# Patient Record
Sex: FEMALE | Race: WHITE | NOT HISPANIC OR LATINO | Employment: UNEMPLOYED | URBAN - METROPOLITAN AREA
[De-identification: names, ages, dates, MRNs, and addresses within clinical notes are randomized per-mention and may not be internally consistent; named-entity substitution may affect disease eponyms.]

---

## 2019-08-03 ENCOUNTER — HOSPITAL ENCOUNTER (OUTPATIENT)
Dept: URGENT CARE | Facility: CLINIC | Age: 39
Discharge: HOME | End: 2019-08-03

## 2019-09-23 ENCOUNTER — HOSPITAL ENCOUNTER (OUTPATIENT)
Dept: URGENT CARE | Facility: CLINIC | Age: 39
Discharge: HOME | End: 2019-09-23
Attending: PHYSICIAN ASSISTANT

## 2022-10-13 ENCOUNTER — APPOINTMENT (OUTPATIENT)
Dept: GENERAL RADIOLOGY | Facility: HOSPITAL | Age: 42
End: 2022-10-13

## 2022-10-13 ENCOUNTER — HOSPITAL ENCOUNTER (EMERGENCY)
Facility: HOSPITAL | Age: 42
Discharge: HOME | End: 2022-10-13
Attending: EMERGENCY MEDICINE | Admitting: EMERGENCY MEDICINE

## 2022-10-13 VITALS
HEIGHT: 62 IN | TEMPERATURE: 97.5 F | WEIGHT: 200 LBS | BODY MASS INDEX: 36.8 KG/M2 | OXYGEN SATURATION: 98 % | SYSTOLIC BLOOD PRESSURE: 119 MMHG | RESPIRATION RATE: 15 BRPM | DIASTOLIC BLOOD PRESSURE: 76 MMHG | HEART RATE: 74 BPM

## 2022-10-13 DIAGNOSIS — M79.672 LEFT FOOT PAIN: Primary | ICD-10-CM

## 2022-10-13 DIAGNOSIS — S90.32XA CONTUSION OF LEFT FOOT, INITIAL ENCOUNTER: ICD-10-CM

## 2022-10-13 PROCEDURE — 99283 EMERGENCY DEPT VISIT LOW MDM: CPT

## 2022-10-13 PROCEDURE — 73630 X-RAY EXAM OF FOOT: CPT

## 2022-10-13 NOTE — ED PROVIDER NOTES
Subjective   History of Present Illness  Patient is a 42-year-old obese female who comes in complaining of pain to the left heel.  She states that she was moving an umbrella on her porch today when it came back and hit her in the heel and she had immediate large amount of swelling and she states that she had pain and she began to feel lightheaded she states that she felt better when she elevated her leg.  She states the pain is minimal now she states it is a 4/10.  She states that it is constant but worse when she tries to ambulate.  She states the swelling is gone down since she is elevated her leg        Review of Systems   Constitutional: Negative for chills, fatigue and fever.   HENT: Negative for congestion, tinnitus and trouble swallowing.    Eyes: Negative for photophobia, discharge and redness.   Respiratory: Negative for cough and shortness of breath.    Cardiovascular: Negative for chest pain and palpitations.   Gastrointestinal: Negative for abdominal pain, diarrhea, nausea and vomiting.   Genitourinary: Negative for dysuria, frequency and urgency.   Musculoskeletal: Negative for back pain, joint swelling and myalgias.        Left foot pain   Skin: Negative for rash.   Neurological: Negative for dizziness and headaches.   Psychiatric/Behavioral: Negative for confusion.   All other systems reviewed and are negative.      Past Medical History:   Diagnosis Date   • Asthma        Allergies   Allergen Reactions   • Augmentin [Amoxicillin-Pot Clavulanate] Rash       History reviewed. No pertinent surgical history.    History reviewed. No pertinent family history.    Social History     Socioeconomic History   • Marital status:    Tobacco Use   • Smoking status: Never   • Smokeless tobacco: Never   Substance and Sexual Activity   • Alcohol use: Not Currently           Objective   Physical Exam  Vitals reviewed.   Constitutional:       General: She is not in acute distress.     Appearance: She is  well-developed. She is obese. She is not ill-appearing or toxic-appearing.   HENT:      Head: Normocephalic and atraumatic.   Eyes:      Conjunctiva/sclera: Conjunctivae normal.      Pupils: Pupils are equal, round, and reactive to light.   Cardiovascular:      Rate and Rhythm: Normal rate and regular rhythm.      Pulses: Normal pulses.           Dorsalis pedis pulses are 2+ on the right side and 2+ on the left side.        Posterior tibial pulses are 2+ on the right side and 2+ on the left side.      Heart sounds: Normal heart sounds.   Pulmonary:      Effort: Pulmonary effort is normal. No respiratory distress.      Breath sounds: Normal breath sounds. No wheezing.   Musculoskeletal:         General: No deformity. Normal range of motion.      Cervical back: Normal range of motion and neck supple.      Left foot: No deformity.        Feet:    Feet:      Right foot:      Skin integrity: Skin integrity normal.      Left foot:      Skin integrity: Skin integrity normal.   Skin:     General: Skin is warm and dry.      Capillary Refill: Capillary refill takes less than 2 seconds.   Neurological:      General: No focal deficit present.      Mental Status: She is alert and oriented to person, place, and time.      GCS: GCS eye subscore is 4. GCS verbal subscore is 5. GCS motor subscore is 6.      Cranial Nerves: No cranial nerve deficit.      Sensory: No sensory deficit.      Deep Tendon Reflexes: Reflexes normal.   Psychiatric:         Attention and Perception: Attention normal.         Mood and Affect: Mood normal.         Speech: Speech normal.         Behavior: Behavior normal. Behavior is cooperative.         Procedures           ED Course  ED Course as of 10/13/22 1542   u Oct 13, 2022   1542 Patient was walked by the ER technician and did well-was told of the negative x-ray and the need to follow-up with Dr. Grier if not improving and to return if worse she verbalized understood discharge instruction [KW]     "  ED Course User Index  [KW] Susy Wolf, APRN      /76 (BP Location: Left arm, Patient Position: Sitting)   Pulse 74   Temp 97.5 °F (36.4 °C) (Oral)   Resp 15   Ht 157.5 cm (62\")   Wt 90.7 kg (200 lb)   LMP 09/22/2022 (Approximate)   SpO2 98%   Breastfeeding No   BMI 36.58 kg/m²   Labs Reviewed - No data to display  Medications - No data to display  XR Foot 3+ View Left    Result Date: 10/13/2022  Negative for fracture.  Electronically Signed By-Dick Marquez MD On:10/13/2022 3:26 PM This report was finalized on [id number] by  Dick Marquez MD.                                         MDM  Number of Diagnoses or Management Options  Contusion of left foot, initial encounter  Left foot pain  Diagnosis management comments: Patient had above exam and x-ray was obtained and reviewed and discussed with the patient and found to be within normal limits the patient was told [id number] with Dr. Grier if not improving patient verbalized understood discharge instructions use Tylenol Motrin for discomfort she was ambulatory at discharge without any distress she was discharged home with her  at       Amount and/or Complexity of Data Reviewed  Tests in the radiology section of CPT®: reviewed    Risk of Complications, Morbidity, and/or Mortality  Presenting problems: high  Diagnostic procedures: high  Management options: high    Patient Progress  Patient progress: improved      Final diagnoses:   Left foot pain   Contusion of left foot, initial encounter       ED Disposition  ED Disposition     ED Disposition   Discharge    Condition   Stable    Comment   --             GAEL Grier DPM  [address] [place] [address]  [phone]    In 3 days  If symptoms worsen, As needed         Medication List      No changes were made to your prescriptions during this visit.          Susy Wolf, APRN  10/13/22 1542    "

## 2022-10-13 NOTE — DISCHARGE INSTRUCTIONS
Use Tylenol and Motrin as needed for discomfort    Follow-up with Dr. Grier the on-call podiatrist for pain greater than 5 days.    Return if worse

## 2023-02-01 ENCOUNTER — OFFICE VISIT (OUTPATIENT)
Dept: FAMILY MEDICINE CLINIC | Facility: CLINIC | Age: 43
End: 2023-02-01
Payer: OTHER GOVERNMENT

## 2023-02-01 VITALS
HEART RATE: 107 BPM | OXYGEN SATURATION: 99 % | WEIGHT: 212.6 LBS | BODY MASS INDEX: 37.67 KG/M2 | HEIGHT: 63 IN | DIASTOLIC BLOOD PRESSURE: 78 MMHG | SYSTOLIC BLOOD PRESSURE: 112 MMHG | RESPIRATION RATE: 18 BRPM

## 2023-02-01 DIAGNOSIS — Z12.4 CERVICAL CANCER SCREENING: ICD-10-CM

## 2023-02-01 DIAGNOSIS — Z12.31 ENCOUNTER FOR SCREENING MAMMOGRAM FOR MALIGNANT NEOPLASM OF BREAST: ICD-10-CM

## 2023-02-01 DIAGNOSIS — J45.20 MILD INTERMITTENT ASTHMA WITHOUT COMPLICATION: ICD-10-CM

## 2023-02-01 DIAGNOSIS — Z00.00 ANNUAL PHYSICAL EXAM: Primary | ICD-10-CM

## 2023-02-01 PROCEDURE — 99396 PREV VISIT EST AGE 40-64: CPT | Performed by: STUDENT IN AN ORGANIZED HEALTH CARE EDUCATION/TRAINING PROGRAM

## 2023-02-01 RX ORDER — ALBUTEROL SULFATE 90 UG/1
2 AEROSOL, METERED RESPIRATORY (INHALATION) EVERY 4 HOURS PRN
Qty: 18 G | Refills: 1 | Status: SHIPPED | OUTPATIENT
Start: 2023-02-01

## 2023-02-01 NOTE — PROGRESS NOTES
"Chief Complaint  Chief Complaint   Patient presents with   • Establish Care   • Asthma     Subjective        Geetha Zambrano is a 42 y.o. female who presents to [facility].    History of Present Illness  Here to establish care.    She has not had a PCP in a while.   She has history of asthma but reports it is not active at this time.  It is generally activity or allergy induced.      Has lost 22 pounds since last August.  Eating keto.  Starting to get back into the gym, typically walks.  Sleep: no concerns, 8 hrs typically.      Family history: paternal GM w/ colon ca in her 50s.  Maternal / paternal GM both w/ breast cancer.       Has not had a mammogram yet.  Has not had a pap smear since her last pregnancy 3 yrs ago.      No history of gestational DM.      Objective   /78   Pulse 107   Resp 18   Ht 160 cm (63\")   Wt 96.4 kg (212 lb 9.6 oz)   SpO2 99%   BMI 37.66 kg/m²     Estimated body mass index is 37.66 kg/m² as calculated from the following:    Height as of this encounter: 160 cm (63\").    Weight as of this encounter: 96.4 kg (212 lb 9.6 oz).     Physical Exam   GEN: In no acute distress, non toxic appearing  HEENT: Pupils equal and reactive to light, sclera clear. Mucous membranes moist. Oropharynx without erythema or exudate. No cervical or submandibular lymphadenopathy.  TMs WNL bilaterally.    CV: Regular rate and rhythm, no murmurs, 2+ peripheral pulses, No extremity edema.   RESP: Lungs clear to auscultation anteriorly and posteriorly in all lung fields bilaterally.  NEURO: AAO to person, place, and time. CN 2-12 intact grossly  PSYCH: Affect normal, insight fair     PHQ-2 Depression Screening  Little interest or pleasure in doing things? 0-->not at all   Feeling down, depressed, or hopeless? 0-->not at all   PHQ-2 Total Score 0      Result Review :              Assessment and Plan     Diagnoses and all orders for this visit:    1. Annual physical exam " (Primary)  Overall reassuring exam.  Discussed healthy diet and exercise recs.  Exciting she has lost 22 pounds since last August, encourage to keep up good work.  Refer to gyn for pap.  Start mammograms for breast ca screening.  Will be due for colon ca screening next year.  F/u in 1 yr or sooner prn.    -     Comprehensive metabolic panel; Future  -     CBC (No Diff); Future  -     Lipid Panel; Future    2. Encounter for screening mammogram for malignant neoplasm of breast  -     Mammo Screening Digital Tomosynthesis Bilateral With CAD; Future    3. Mild intermittent asthma without complication  Refill albuterol inhaler  -     albuterol sulfate  (90 Base) MCG/ACT inhaler; Inhale 2 puffs Every 4 (Four) Hours As Needed for Wheezing.  Dispense: 18 g; Refill: 1    4. Cervical cancer screening  -     Ambulatory Referral to Gynecology       Follow Up     Return in about 1 year (around 2/1/2024) for Annual physical.

## 2023-02-03 ENCOUNTER — LAB (OUTPATIENT)
Dept: FAMILY MEDICINE CLINIC | Facility: CLINIC | Age: 43
End: 2023-02-03
Payer: OTHER GOVERNMENT

## 2023-02-03 DIAGNOSIS — Z00.00 ANNUAL PHYSICAL EXAM: ICD-10-CM

## 2023-02-03 LAB
ALBUMIN SERPL-MCNC: 4.4 G/DL (ref 3.5–5.2)
ALBUMIN/GLOB SERPL: 2.2 G/DL
ALP SERPL-CCNC: 52 U/L (ref 39–117)
ALT SERPL W P-5'-P-CCNC: 20 U/L (ref 1–33)
ANION GAP SERPL CALCULATED.3IONS-SCNC: 8 MMOL/L (ref 5–15)
AST SERPL-CCNC: 19 U/L (ref 1–32)
BILIRUB SERPL-MCNC: 0.3 MG/DL (ref 0–1.2)
BUN SERPL-MCNC: 12 MG/DL (ref 6–20)
BUN/CREAT SERPL: 14.3 (ref 7–25)
CALCIUM SPEC-SCNC: 9.4 MG/DL (ref 8.6–10.5)
CHLORIDE SERPL-SCNC: 104 MMOL/L (ref 98–107)
CHOLEST SERPL-MCNC: 188 MG/DL (ref 0–200)
CO2 SERPL-SCNC: 27 MMOL/L (ref 22–29)
CREAT SERPL-MCNC: 0.84 MG/DL (ref 0.57–1)
DEPRECATED RDW RBC AUTO: 40.2 FL (ref 37–54)
EGFRCR SERPLBLD CKD-EPI 2021: 88 ML/MIN/1.73
ERYTHROCYTE [DISTWIDTH] IN BLOOD BY AUTOMATED COUNT: 12.7 % (ref 12.3–15.4)
GLOBULIN UR ELPH-MCNC: 2 GM/DL
GLUCOSE SERPL-MCNC: 105 MG/DL (ref 65–99)
HCT VFR BLD AUTO: 41.1 % (ref 34–46.6)
HDLC SERPL-MCNC: 55 MG/DL (ref 40–60)
HGB BLD-MCNC: 14 G/DL (ref 12–15.9)
LDLC SERPL CALC-MCNC: 117 MG/DL (ref 0–100)
LDLC/HDLC SERPL: 2.11 {RATIO}
MCH RBC QN AUTO: 30 PG (ref 26.6–33)
MCHC RBC AUTO-ENTMCNC: 34.1 G/DL (ref 31.5–35.7)
MCV RBC AUTO: 88 FL (ref 79–97)
PLATELET # BLD AUTO: 268 10*3/MM3 (ref 140–450)
PMV BLD AUTO: 10.3 FL (ref 6–12)
POTASSIUM SERPL-SCNC: 4 MMOL/L (ref 3.5–5.2)
PROT SERPL-MCNC: 6.4 G/DL (ref 6–8.5)
RBC # BLD AUTO: 4.67 10*6/MM3 (ref 3.77–5.28)
SODIUM SERPL-SCNC: 139 MMOL/L (ref 136–145)
TRIGL SERPL-MCNC: 85 MG/DL (ref 0–150)
VLDLC SERPL-MCNC: 16 MG/DL (ref 5–40)
WBC NRBC COR # BLD: 8.1 10*3/MM3 (ref 3.4–10.8)

## 2023-02-03 PROCEDURE — 36415 COLL VENOUS BLD VENIPUNCTURE: CPT

## 2023-02-03 PROCEDURE — 80053 COMPREHEN METABOLIC PANEL: CPT | Performed by: STUDENT IN AN ORGANIZED HEALTH CARE EDUCATION/TRAINING PROGRAM

## 2023-02-03 PROCEDURE — 80061 LIPID PANEL: CPT | Performed by: STUDENT IN AN ORGANIZED HEALTH CARE EDUCATION/TRAINING PROGRAM

## 2023-02-03 PROCEDURE — 85027 COMPLETE CBC AUTOMATED: CPT | Performed by: STUDENT IN AN ORGANIZED HEALTH CARE EDUCATION/TRAINING PROGRAM

## 2023-02-07 NOTE — PROGRESS NOTES
I spoke with Geetha and relayed Dr. Chau message that her labs overall looked fine.  Her kidney function, liver function, and blood counts were all normal.  Her LDL cholesterol was slightly elevated at 117, but her current diet changes as well as getting back into the gym like we talked about at her appointment should bring her LDL down.  Otherwise, everything looked good and no changes need to be made at this time.  She said ok and I resent her a [facility].

## 2023-02-24 ENCOUNTER — HOSPITAL ENCOUNTER (OUTPATIENT)
Dept: MAMMOGRAPHY | Facility: HOSPITAL | Age: 43
Discharge: HOME | End: 2023-02-24
Admitting: STUDENT IN AN ORGANIZED HEALTH CARE EDUCATION/TRAINING PROGRAM
Payer: OTHER GOVERNMENT

## 2023-02-24 DIAGNOSIS — Z12.31 ENCOUNTER FOR SCREENING MAMMOGRAM FOR MALIGNANT NEOPLASM OF BREAST: ICD-10-CM

## 2023-02-24 PROCEDURE — 77063 BREAST TOMOSYNTHESIS BI: CPT

## 2023-02-24 PROCEDURE — 77067 SCR MAMMO BI INCL CAD: CPT

## 2023-02-27 ENCOUNTER — TELEPHONE (OUTPATIENT)
Dept: FAMILY MEDICINE CLINIC | Facility: CLINIC | Age: 43
End: 2023-02-27
Payer: OTHER GOVERNMENT

## 2023-02-27 NOTE — TELEPHONE ENCOUNTER
----- Message from Doyle Elias DO sent at 2/24/2023  2:47 PM EST -----  Please let Geetha know her mammogram was negative for any signs of cancer which is good news.  We will discuss repeating next year at her next physical.

## 2023-02-27 NOTE — TELEPHONE ENCOUNTER
I spoke with Geetha and relayed Dr. Elias's message that her mammogram was negative for any signs of cancer and we will discuss repeating at her next annual physical.

## 2023-09-20 ENCOUNTER — OFFICE VISIT (OUTPATIENT)
Dept: FAMILY MEDICINE CLINIC | Facility: CLINIC | Age: 43
End: 2023-09-20
Payer: OTHER GOVERNMENT

## 2023-09-20 VITALS
DIASTOLIC BLOOD PRESSURE: 81 MMHG | RESPIRATION RATE: 18 BRPM | WEIGHT: 201.2 LBS | SYSTOLIC BLOOD PRESSURE: 122 MMHG | OXYGEN SATURATION: 98 % | HEIGHT: 63 IN | HEART RATE: 83 BPM | BODY MASS INDEX: 35.65 KG/M2

## 2023-09-20 DIAGNOSIS — E66.9 OBESITY, CLASS II, BMI 35-39.9: Primary | ICD-10-CM

## 2023-09-20 PROCEDURE — 99213 OFFICE O/P EST LOW 20 MIN: CPT | Performed by: STUDENT IN AN ORGANIZED HEALTH CARE EDUCATION/TRAINING PROGRAM

## 2023-09-20 RX ORDER — PHENTERMINE HYDROCHLORIDE 37.5 MG/1
37.5 TABLET ORAL
Qty: 90 TABLET | Refills: 0 | Status: SHIPPED | OUTPATIENT
Start: 2023-09-20

## 2023-09-20 NOTE — PROGRESS NOTES
"Chief Complaint  Chief Complaint   Patient presents with    Weight Loss     Subjective        Geetha Lawton is a 42 y.o. female who presents to [facility].    History of Present Illness  Here to discuss weight loss.  She has lost about 30 pounds since August of last year through keto diet.  She has had success but does feel like the weight loss has slowed down recently.    She is interested in weight loss medication to help boost her weight loss again.      Objective   /81   Pulse 83   Resp 18   Ht 160 cm (63\")   Wt 91.3 kg (201 lb 3.2 oz)   SpO2 98%   BMI 35.64 kg/m²     Estimated body mass index is 35.64 kg/m² as calculated from the following:    Height as of this encounter: 160 cm (63\").    Weight as of this encounter: 91.3 kg (201 lb 3.2 oz).     Physical Exam   GEN: In no acute distress, non toxic appearing    Result Review :              Assessment and Plan     Diagnoses and all orders for this visit:    1. Obesity, Class II, BMI 35-39.9 (Primary)  Discussed weight loss options.  Wegovy on nationwide shortage right now so not an option.  Will do 3 months of phentermine daily.  Discussed side effects to monitor for - she has been on in the past w/o negative side effects.  Continue low carb diet.  F/u 3 months.    -     phentermine (ADIPEX-P) 37.5 MG tablet; Take 1 tablet by mouth Every Morning Before Breakfast.  Dispense: 90 tablet; Refill: 0      Follow Up     Return in about 3 months (around 12/20/2023) for weight check .  "

## 2023-10-24 ENCOUNTER — TELEPHONE (OUTPATIENT)
Dept: FAMILY MEDICINE CLINIC | Facility: CLINIC | Age: 43
End: 2023-10-24
Payer: OTHER GOVERNMENT

## 2023-10-24 DIAGNOSIS — E66.9 OBESITY, CLASS II, BMI 35-39.9: Primary | ICD-10-CM

## 2023-10-24 NOTE — TELEPHONE ENCOUNTER
Patient has been trying to get her Phentermine filled since 9/20/2023 with no luck.  The 37.5mg tablets are out of stock with no return date known.  Needs a new rx sent in for Phentermine 37.5mg CAPSULES to [facility].  She will need a PA.  Their phone number for PA is [phone] and fax is [phone].  I told the patient we don't call for PA's but I took the numbers any way.

## 2023-10-25 RX ORDER — PHENTERMINE HYDROCHLORIDE 37.5 MG/1
37.5 CAPSULE ORAL EVERY MORNING
Qty: 90 CAPSULE | Refills: 0 | Status: SHIPPED | OUTPATIENT
Start: 2023-10-25

## 2023-10-25 NOTE — TELEPHONE ENCOUNTER
Gave message to patient at 9:36am.  She said that going thru [facility] is free for her.  There is no co-pay for her thru [facility].    She still wants rx for Phentermine 37.5mg CAPSULES #90 sent to [facility].

## 2023-11-06 ENCOUNTER — TELEPHONE (OUTPATIENT)
Dept: FAMILY MEDICINE CLINIC | Facility: CLINIC | Age: 43
End: 2023-11-06

## 2023-11-06 ENCOUNTER — OFFICE VISIT (OUTPATIENT)
Dept: FAMILY MEDICINE CLINIC | Facility: CLINIC | Age: 43
End: 2023-11-06
Payer: OTHER GOVERNMENT

## 2023-11-06 VITALS
BODY MASS INDEX: 36.71 KG/M2 | RESPIRATION RATE: 18 BRPM | SYSTOLIC BLOOD PRESSURE: 123 MMHG | DIASTOLIC BLOOD PRESSURE: 79 MMHG | HEART RATE: 91 BPM | HEIGHT: 63 IN | WEIGHT: 207.2 LBS | OXYGEN SATURATION: 96 %

## 2023-11-06 DIAGNOSIS — H93.8X3 PRESSURE SENSATION IN BOTH EARS: ICD-10-CM

## 2023-11-06 DIAGNOSIS — J01.10 ACUTE NON-RECURRENT FRONTAL SINUSITIS: Primary | ICD-10-CM

## 2023-11-06 DIAGNOSIS — J02.9 ACUTE SORE THROAT: ICD-10-CM

## 2023-11-06 PROCEDURE — 99213 OFFICE O/P EST LOW 20 MIN: CPT | Performed by: STUDENT IN AN ORGANIZED HEALTH CARE EDUCATION/TRAINING PROGRAM

## 2023-11-06 RX ORDER — DOXYCYCLINE HYCLATE 100 MG/1
100 CAPSULE ORAL 2 TIMES DAILY
Qty: 14 CAPSULE | Refills: 0 | Status: SHIPPED | OUTPATIENT
Start: 2023-11-06 | End: 2023-11-13

## 2023-11-06 NOTE — PROGRESS NOTES
"Chief Complaint  Chief Complaint   Patient presents with    Sore Throat    Earache    Nasal Congestion    Cough    Fever     Subjective        Geetha Lawton is a 43 y.o. female who presents to [facility].    History of Present Illness  Here for acute visit.    Symptoms started last Thursday.  Sick contacts at home.  She has facial pain, pressure, ear pressure, cough, sore throat, hoarse voice.    Alternating ibuprofen / tylenol.   Drinking hot tea, soups which helps with her sore throat.    Also starting to develop redness of both eyes.      Objective   /79   Pulse 91   Resp 18   Ht 160 cm (63\")   Wt 94 kg (207 lb 3.2 oz)   SpO2 96%   BMI 36.70 kg/m²     Estimated body mass index is 36.7 kg/m² as calculated from the following:    Height as of this encounter: 160 cm (63\").    Weight as of this encounter: 94 kg (207 lb 3.2 oz).     Physical Exam   GEN: In no acute distress, fatigued appearing, hoarse sounding   HEENT: Pupils equal and reactive to light, sclera clear. Mucous membranes moist. Oropharynx without erythema or exudate. + tender cervical and submandibular lymphadenopathy.  Bilateral TM's w/ fluid behind.    CV: Regular rate and rhythm, no murmurs  RESP: Lungs clear to auscultation anteriorly and posteriorly in all lung fields bilaterally.     Result Review :              Assessment and Plan     Diagnoses and all orders for this visit:    1. Acute non-recurrent frontal sinusitis (Primary)  Treat with doxycycline 100 mg bid x 7 days.  Continue daily nasacort.  Continue alternating tylenol / ibuprofen, hot teas, soups.  If eyes worsen in purulent drainage let me know and I will send in antibacterial drops.  Drink plenty of fluids.    -     doxycycline (VIBRAMYCIN) 100 MG capsule; Take 1 capsule by mouth 2 (Two) Times a Day for 7 days.  Dispense: 14 capsule; Refill: 0    2. Pressure sensation in both ears  See above    3. Acute sore throat  See above     "     Follow Up   If no improvement after 72 hrs

## 2023-11-06 NOTE — TELEPHONE ENCOUNTER
Dr. Elias    Ins companies prefer the phentermine tablet  over the capsule   would you be ok to change to tablets

## 2023-11-06 NOTE — TELEPHONE ENCOUNTER
----- Message from Elicia Gauthier MA sent at 11/2/2023  3:56 PM EDT -----  Regarding: FW: Prio Approval   Contact: [phone]  Do you  know if a PA has been done?    ----- Message -----  From: Geetha Lawton  Sent: 11/2/2023  10:32 AM EDT  To: [facility] Clinical Pool  Subject: Prio Approval                                    I received the following message from [facility] Oct 26th regarding the Rx from Dr. Elias for Phentermine:  Prior Authorization Required     Before we can ship your medicine, we need your benefits plan to approve it through a process called prior authorization to make sure your plan covers this medicine. We reached out to them, and want to let you know that your medicine delivery could be delayed until they respond. If we don't receive the authorization and are unable to fill this prescription, we'll let you know.     Please let me know if the prior authorization has been requested ( is my insurance carrier).  Thank you,  Geetha

## 2023-11-06 NOTE — TELEPHONE ENCOUNTER
RELAY    Left detailed message on patient's voice mail at 4:25pm.  PLEASE SEE IF WE CAN CALL LOCAL 90 DAYS IN TABLET FORM IT SHOULD GO THRU HER INS.  And what local pharmacy?

## 2023-11-07 NOTE — TELEPHONE ENCOUNTER
RELAY     Left detailed message on patient's voice mail at 2:04pm.  PLEASE SEE IF WE CAN CALL LOCAL 90 DAYS IN TABLET FORM IT SHOULD GO THRU HER INS.  And what local pharmacy?

## 2023-11-08 NOTE — TELEPHONE ENCOUNTER
Spoke with patient and she said no matter if she gets the rx from mail order or local, it WILL require a PA no matter what.  And she has been told it will get approved because she does qualify for the med.  She doesn't care if it is tablets or capsules, but she prefers to use [facility] because there will be zero dollar co-pay thru mail order.  If local, she would have a co-pay.  [facility] already has an rx.  Patient is asking if you will please do the PA for that rx and it will be approved.

## 2024-02-13 ENCOUNTER — OFFICE VISIT (OUTPATIENT)
Dept: FAMILY MEDICINE CLINIC | Facility: CLINIC | Age: 44
End: 2024-02-13
Payer: OTHER GOVERNMENT

## 2024-02-13 VITALS
HEART RATE: 108 BPM | RESPIRATION RATE: 18 BRPM | OXYGEN SATURATION: 99 % | DIASTOLIC BLOOD PRESSURE: 78 MMHG | BODY MASS INDEX: 35.5 KG/M2 | SYSTOLIC BLOOD PRESSURE: 118 MMHG | HEIGHT: 61 IN | WEIGHT: 188 LBS

## 2024-02-13 DIAGNOSIS — Z12.31 ENCOUNTER FOR SCREENING MAMMOGRAM FOR MALIGNANT NEOPLASM OF BREAST: ICD-10-CM

## 2024-02-13 DIAGNOSIS — E66.9 OBESITY, CLASS II, BMI 35-39.9: Primary | ICD-10-CM

## 2024-02-13 DIAGNOSIS — Z12.4 CERVICAL CANCER SCREENING: ICD-10-CM

## 2024-02-13 PROCEDURE — 99214 OFFICE O/P EST MOD 30 MIN: CPT | Performed by: STUDENT IN AN ORGANIZED HEALTH CARE EDUCATION/TRAINING PROGRAM

## 2024-02-20 ENCOUNTER — TELEPHONE (OUTPATIENT)
Dept: FAMILY MEDICINE CLINIC | Facility: CLINIC | Age: 44
End: 2024-02-20
Payer: OTHER GOVERNMENT

## 2024-02-20 NOTE — TELEPHONE ENCOUNTER
Caller: Geetha Lawton    Relationship: Self    Best call back number:     [phone] (Mobile)       What form or medical record are you requesting:   Tirzepatide-Weight Management (ZEPBOUND) 2.5 MG/0.5ML solution auto-injector  2.5 mg, Weekly       Who is requesting this form or medical record from you: PAYER    PATIENT REQUEST A RETURN CALL FROM STAFF THAT MANAGES THE PRIOR AUTHORIZATION REQUEST ASAP.    SHE IS TRYING TO BE PROACTIVE IN GETTING APPROVAL OF THIS MEDICATION THROUGH WITHOUT THE DELAY THAT SHE EXPERIENCED LAST TIME., SHE SAID.    THANK YOU

## 2024-02-22 ENCOUNTER — TELEPHONE (OUTPATIENT)
Dept: FAMILY MEDICINE CLINIC | Facility: CLINIC | Age: 44
End: 2024-02-22
Payer: OTHER GOVERNMENT

## 2024-02-22 NOTE — TELEPHONE ENCOUNTER
We never got a  PA on this but Iwent ahead and sent without it     I got a response that there is a PA case already  Status: in process    Case ID  [id number]

## 2024-02-29 ENCOUNTER — HOSPITAL ENCOUNTER (OUTPATIENT)
Dept: MAMMOGRAPHY | Facility: HOSPITAL | Age: 44
Discharge: HOME | End: 2024-02-29
Admitting: STUDENT IN AN ORGANIZED HEALTH CARE EDUCATION/TRAINING PROGRAM
Payer: OTHER GOVERNMENT

## 2024-02-29 ENCOUNTER — TELEPHONE (OUTPATIENT)
Dept: FAMILY MEDICINE CLINIC | Facility: CLINIC | Age: 44
End: 2024-02-29
Payer: OTHER GOVERNMENT

## 2024-02-29 DIAGNOSIS — Z12.31 ENCOUNTER FOR SCREENING MAMMOGRAM FOR MALIGNANT NEOPLASM OF BREAST: ICD-10-CM

## 2024-02-29 PROCEDURE — 77063 BREAST TOMOSYNTHESIS BI: CPT

## 2024-02-29 PROCEDURE — 77067 SCR MAMMO BI INCL CAD: CPT

## 2024-02-29 NOTE — TELEPHONE ENCOUNTER
Caller: Geetha Lawton    Relationship to patient: Self    Best call back number: [phone]    Patient is needing:     CALLING TO CHECK ON THE STATUS OF THE PA FOR THE MEDICATION:     Tirzepatide-Weight Management (ZEPBOUND) 2.5 MG/0.5ML solution auto-injector

## 2024-03-05 DIAGNOSIS — E66.9 OBESITY, CLASS II, BMI 35-39.9: ICD-10-CM

## 2024-03-05 NOTE — TELEPHONE ENCOUNTER
Caller: Geetha Lawton    Relationship: Self    Best call back number: [phone]    Requested Prescriptions:   Requested Prescriptions     Pending Prescriptions Disp Refills    Tirzepatide-Weight Management (ZEPBOUND) 2.5 MG/0.5ML solution auto-injector 2 mL 0     Sig: Inject 0.5 mL under the skin into the appropriate area as directed 1 (One) Time Per Week.        Pharmacy where request should be sent: [facility] [address] [phone] [facility] [phone]      Last office visit with prescribing clinician: 2/13/2024   Last telemedicine visit with prescribing clinician: Visit date not found   Next office visit with prescribing clinician: 5/28/2024     Additional details provided by patient: WILL NEED SENT TO [facility], STATED THEY NEEDED ANSWERS TO SOME QUESTIONS ABOUT PRIOR WEIGHT LOSS DRUGS , PHENTERAMINE      HAVE 8 DAYS TO .RESPOND  WILL NEED NEW REQUEST SUBMITTED, THE LAST ONE TIMED OUT.     STATED THE QUESTIONS ARE LISTED ON THE [employer] PORTAL , STATED CALL IN IS FASTER     Does the patient have less than a 3 day supply:  [x] Yes  [] No    Would you like a call back once the refill request has been completed: [] Yes [x] No    If the office needs to give you a call back, can they leave a voicemail: [] Yes [x] No    Levi Luu   03/05/24 11:42 EST

## 2024-04-18 ENCOUNTER — TELEPHONE (OUTPATIENT)
Dept: FAMILY MEDICINE CLINIC | Facility: CLINIC | Age: 44
End: 2024-04-18
Payer: OTHER GOVERNMENT

## 2024-04-18 NOTE — TELEPHONE ENCOUNTER
Pharmacy Name: [facility] - [place], [place] - [address] [facility] [phone] [facility] [phone] FX     What medication are you calling in regards to: Tirzepatide-Weight Management (ZEPBOUND) 2.5 MG/0.5ML solution auto-injector     What question does the pharmacy have: NEEDING A CALL TO GO OVER CLINICAL QUESTIONS FOR PRIOR AUTH [phone]

## 2024-05-28 ENCOUNTER — OFFICE VISIT (OUTPATIENT)
Dept: FAMILY MEDICINE CLINIC | Facility: CLINIC | Age: 44
End: 2024-05-28
Payer: OTHER GOVERNMENT

## 2024-05-28 ENCOUNTER — LAB (OUTPATIENT)
Dept: FAMILY MEDICINE CLINIC | Facility: CLINIC | Age: 44
End: 2024-05-28
Payer: OTHER GOVERNMENT

## 2024-05-28 VITALS
OXYGEN SATURATION: 97 % | HEIGHT: 61 IN | RESPIRATION RATE: 18 BRPM | SYSTOLIC BLOOD PRESSURE: 119 MMHG | WEIGHT: 203.2 LBS | BODY MASS INDEX: 38.36 KG/M2 | DIASTOLIC BLOOD PRESSURE: 78 MMHG | HEART RATE: 97 BPM

## 2024-05-28 DIAGNOSIS — R53.83 OTHER FATIGUE: ICD-10-CM

## 2024-05-28 DIAGNOSIS — Z00.00 ANNUAL PHYSICAL EXAM: Primary | ICD-10-CM

## 2024-05-28 DIAGNOSIS — Z12.4 CERVICAL CANCER SCREENING: ICD-10-CM

## 2024-05-28 DIAGNOSIS — Z12.11 COLON CANCER SCREENING: ICD-10-CM

## 2024-05-28 DIAGNOSIS — S76.012S STRAIN OF FLEXOR MUSCLE OF LEFT HIP, SEQUELA: ICD-10-CM

## 2024-05-28 LAB
ALBUMIN SERPL-MCNC: 4.3 G/DL (ref 3.5–5.2)
ALBUMIN/GLOB SERPL: 1.8 G/DL
ALP SERPL-CCNC: 49 U/L (ref 39–117)
ALT SERPL W P-5'-P-CCNC: 22 U/L (ref 1–33)
ANION GAP SERPL CALCULATED.3IONS-SCNC: 9 MMOL/L (ref 5–15)
AST SERPL-CCNC: 13 U/L (ref 1–32)
BASOPHILS # BLD AUTO: 0.08 10*3/MM3 (ref 0–0.2)
BASOPHILS NFR BLD AUTO: 0.9 % (ref 0–1.5)
BILIRUB SERPL-MCNC: 0.3 MG/DL (ref 0–1.2)
BUN SERPL-MCNC: 15 MG/DL (ref 6–20)
BUN/CREAT SERPL: 19.7 (ref 7–25)
CALCIUM SPEC-SCNC: 9.3 MG/DL (ref 8.6–10.5)
CHLORIDE SERPL-SCNC: 102 MMOL/L (ref 98–107)
CHOLEST SERPL-MCNC: 197 MG/DL (ref 0–200)
CO2 SERPL-SCNC: 27 MMOL/L (ref 22–29)
CREAT SERPL-MCNC: 0.76 MG/DL (ref 0.57–1)
DEPRECATED RDW RBC AUTO: 40.5 FL (ref 37–54)
EGFRCR SERPLBLD CKD-EPI 2021: 98.6 ML/MIN/1.73
EOSINOPHIL # BLD AUTO: 0.25 10*3/MM3 (ref 0–0.4)
EOSINOPHIL NFR BLD AUTO: 2.8 % (ref 0.3–6.2)
ERYTHROCYTE [DISTWIDTH] IN BLOOD BY AUTOMATED COUNT: 12.4 % (ref 12.3–15.4)
GLOBULIN UR ELPH-MCNC: 2.4 GM/DL
GLUCOSE SERPL-MCNC: 89 MG/DL (ref 65–99)
HBA1C MFR BLD: 5.4 % (ref 4.8–5.6)
HCG INTACT+B SERPL-ACNC: <1 MIU/ML
HCT VFR BLD AUTO: 41.9 % (ref 34–46.6)
HDLC SERPL-MCNC: 85 MG/DL (ref 40–60)
HGB BLD-MCNC: 14 G/DL (ref 12–15.9)
IMM GRANULOCYTES # BLD AUTO: 0.06 10*3/MM3 (ref 0–0.05)
IMM GRANULOCYTES NFR BLD AUTO: 0.7 % (ref 0–0.5)
LDLC SERPL CALC-MCNC: 91 MG/DL (ref 0–100)
LDLC/HDLC SERPL: 1.04 {RATIO}
LYMPHOCYTES # BLD AUTO: 2.62 10*3/MM3 (ref 0.7–3.1)
LYMPHOCYTES NFR BLD AUTO: 29 % (ref 19.6–45.3)
MCH RBC QN AUTO: 29.8 PG (ref 26.6–33)
MCHC RBC AUTO-ENTMCNC: 33.4 G/DL (ref 31.5–35.7)
MCV RBC AUTO: 89.1 FL (ref 79–97)
MONOCYTES # BLD AUTO: 0.72 10*3/MM3 (ref 0.1–0.9)
MONOCYTES NFR BLD AUTO: 8 % (ref 5–12)
NEUTROPHILS NFR BLD AUTO: 5.3 10*3/MM3 (ref 1.7–7)
NEUTROPHILS NFR BLD AUTO: 58.6 % (ref 42.7–76)
NRBC BLD AUTO-RTO: 0 /100 WBC (ref 0–0.2)
PLATELET # BLD AUTO: 228 10*3/MM3 (ref 140–450)
PMV BLD AUTO: 10.1 FL (ref 6–12)
POTASSIUM SERPL-SCNC: 4.3 MMOL/L (ref 3.5–5.2)
PROT SERPL-MCNC: 6.7 G/DL (ref 6–8.5)
RBC # BLD AUTO: 4.7 10*6/MM3 (ref 3.77–5.28)
SODIUM SERPL-SCNC: 138 MMOL/L (ref 136–145)
TRIGL SERPL-MCNC: 120 MG/DL (ref 0–150)
TSH SERPL DL<=0.05 MIU/L-ACNC: 1.95 UIU/ML (ref 0.27–4.2)
VIT B12 BLD-MCNC: 354 PG/ML (ref 211–946)
VLDLC SERPL-MCNC: 21 MG/DL (ref 5–40)
WBC NRBC COR # BLD AUTO: 9.03 10*3/MM3 (ref 3.4–10.8)

## 2024-05-28 PROCEDURE — 84443 ASSAY THYROID STIM HORMONE: CPT | Performed by: STUDENT IN AN ORGANIZED HEALTH CARE EDUCATION/TRAINING PROGRAM

## 2024-05-28 PROCEDURE — 80061 LIPID PANEL: CPT | Performed by: STUDENT IN AN ORGANIZED HEALTH CARE EDUCATION/TRAINING PROGRAM

## 2024-05-28 PROCEDURE — 80053 COMPREHEN METABOLIC PANEL: CPT | Performed by: STUDENT IN AN ORGANIZED HEALTH CARE EDUCATION/TRAINING PROGRAM

## 2024-05-28 PROCEDURE — 99396 PREV VISIT EST AGE 40-64: CPT | Performed by: STUDENT IN AN ORGANIZED HEALTH CARE EDUCATION/TRAINING PROGRAM

## 2024-05-28 PROCEDURE — 82607 VITAMIN B-12: CPT | Performed by: STUDENT IN AN ORGANIZED HEALTH CARE EDUCATION/TRAINING PROGRAM

## 2024-05-28 PROCEDURE — 83036 HEMOGLOBIN GLYCOSYLATED A1C: CPT | Performed by: STUDENT IN AN ORGANIZED HEALTH CARE EDUCATION/TRAINING PROGRAM

## 2024-05-28 PROCEDURE — 84702 CHORIONIC GONADOTROPIN TEST: CPT | Performed by: STUDENT IN AN ORGANIZED HEALTH CARE EDUCATION/TRAINING PROGRAM

## 2024-05-28 PROCEDURE — 36415 COLL VENOUS BLD VENIPUNCTURE: CPT | Performed by: STUDENT IN AN ORGANIZED HEALTH CARE EDUCATION/TRAINING PROGRAM

## 2024-05-28 PROCEDURE — 85025 COMPLETE CBC W/AUTO DIFF WBC: CPT | Performed by: STUDENT IN AN ORGANIZED HEALTH CARE EDUCATION/TRAINING PROGRAM

## 2024-05-28 RX ORDER — METHOCARBAMOL 750 MG/1
750 TABLET, FILM COATED ORAL 3 TIMES DAILY PRN
Qty: 30 TABLET | Refills: 0 | Status: SHIPPED | OUTPATIENT
Start: 2024-05-28

## 2024-05-28 NOTE — PROGRESS NOTES
"Chief Complaint  Chief Complaint   Patient presents with    Annual Exam     Subjective        Geetha Lawton is a 43 y.o. female who presents to [facility].    History of Present Illness  Here for annual physical.    Diet: mostly eats low carb  Exercise: she has an exercise machine that she is trying to get regular about using.    Sleep: no concerns.      Family history: paternal GM w/ colon ca in her 50s. Maternal / paternal GM both w/ breast cancer.     Yesterday started having some L hip flexor pain.  She was on her feet a lot through the day but no specific injury that she can remember.  No especially strenuous activity.    It was really painful last night, some better this morning.    She did not sleep great last night.  She did take some ibuprofen.    She does not snore.      She has been more fatigued than normal recently.    She feels similar to the way she has felt in previous pregnancies.    She did a home pregnancy test but it was negative.      Objective   /78   Pulse 97   Resp 18   Ht 156 cm (61.42\")   Wt 92.2 kg (203 lb 3.2 oz)   SpO2 97%   BMI 37.87 kg/m²     Estimated body mass index is 37.87 kg/m² as calculated from the following:    Height as of this encounter: 156 cm (61.42\").    Weight as of this encounter: 92.2 kg (203 lb 3.2 oz).     Physical Exam   GEN: In no acute distress, non toxic appearing  HEENT: Pupils equal and reactive to light, sclera clear. Mucous membranes moist. Oropharynx without erythema or exudate. No cervical or submandibular lymphadenopathy.  Bilateral TM's wnl.    CV: Regular rate and rhythm, no murmurs, 2+ peripheral pulses, No extremity edema.   RESP: Lungs clear to auscultation anteriorly and posteriorly in all lung fields bilaterally.  NEURO: AAO to person, place, and time. CN 2-12 intact grossly.  PSYCH: Affect normal, insight fair      Result Review :              Assessment and Plan     Diagnoses and all orders for " this visit:    1. Annual physical exam (Primary)  Overall reassuring exam.  BP at goal.  Labs as below.  Refer for colonoscopy.  Refer for pap smear.  Mammogram negative Feb 2024.    Encourage healthy diet w/ plenty of fruits and vegetables, low carb.  Encourage regular exercise.  Next physical in 1 yr.    -     CBC Auto Differential  -     Comprehensive Metabolic Panel  -     Hemoglobin A1c  -     Lipid Panel  -     TSH  -     Vitamin B12    2. Colon cancer screening  -     Ambulatory Referral For Screening Colonoscopy    3. Cervical cancer screening  Refer for pap per her preference for a female provider.    4. Strain of flexor muscle of left hip, sequela  Stretch, prn robaxin.  Instructed not to take prior to driving.    -     methocarbamol (ROBAXIN) 750 MG tablet; Take 1 tablet by mouth 3 (Three) Times a Day As Needed for Muscle Spasms.  Dispense: 30 tablet; Refill: 0    5. Other fatigue  She is concerned for possible pregnancy, check hcg quant.  Check TSH, B12, CBC as above.    -     hCG, Quantitative, Pregnancy       Follow Up     Return in about 1 year (around 5/28/2025) for Annual physical.

## 2024-05-29 ENCOUNTER — TELEPHONE (OUTPATIENT)
Dept: FAMILY MEDICINE CLINIC | Facility: CLINIC | Age: 44
End: 2024-05-29
Payer: OTHER GOVERNMENT

## 2024-05-29 DIAGNOSIS — E66.9 OBESITY, CLASS II, BMI 35-39.9: Primary | ICD-10-CM

## 2024-05-29 RX ORDER — PHENTERMINE HYDROCHLORIDE 37.5 MG/1
37.5 CAPSULE ORAL EVERY MORNING
Qty: 90 CAPSULE | Refills: 0 | Status: SHIPPED | OUTPATIENT
Start: 2024-05-29

## 2024-05-29 NOTE — TELEPHONE ENCOUNTER
NEEDS A PRIOR AUTHORIZATION ALSO       Caller: Geetha Lawton    Relationship: Self    Best call back number:     [phone] (Mobile)       What medication are you requesting: phentermine 37.5 MG capsule     Have you had these symptoms before:    [x] Yes  [] No    Have you been treated for these symptoms before:   [x] Yes  [] No    If a prescription is needed, what is your preferred pharmacy and phone number: [facility] [address] [phone] [facility] [phone]      Additional notes:

## 2024-06-10 ENCOUNTER — TELEPHONE (OUTPATIENT)
Dept: FAMILY MEDICINE CLINIC | Facility: CLINIC | Age: 44
End: 2024-06-10
Payer: OTHER GOVERNMENT

## 2024-06-10 NOTE — TELEPHONE ENCOUNTER
Pharmacy Name: [facility] - [place], [place] - [address] [facility] [phone] [facility] [phone] FX     What medication are you calling in regards to: phentermine 37.5 MG capsule     What question does the pharmacy have: NEEDING PRIOR AUTH

## 2024-09-03 DIAGNOSIS — E66.9 OBESITY, CLASS II, BMI 35-39.9: ICD-10-CM

## 2024-09-03 RX ORDER — PHENTERMINE HYDROCHLORIDE 37.5 MG/1
37.5 CAPSULE ORAL EVERY MORNING
Qty: 90 CAPSULE | Refills: 0 | Status: SHIPPED | OUTPATIENT
Start: 2024-09-03

## 2024-10-04 ENCOUNTER — OFFICE VISIT (OUTPATIENT)
Dept: FAMILY MEDICINE CLINIC | Facility: CLINIC | Age: 44
End: 2024-10-04
Payer: OTHER GOVERNMENT

## 2024-10-04 VITALS
OXYGEN SATURATION: 98 % | SYSTOLIC BLOOD PRESSURE: 126 MMHG | RESPIRATION RATE: 18 BRPM | DIASTOLIC BLOOD PRESSURE: 77 MMHG | BODY MASS INDEX: 37.57 KG/M2 | HEIGHT: 61 IN | HEART RATE: 116 BPM | WEIGHT: 199 LBS

## 2024-10-04 DIAGNOSIS — G89.29 CHRONIC PAIN OF LEFT HEEL: ICD-10-CM

## 2024-10-04 DIAGNOSIS — M79.672 ACUTE FOOT PAIN, LEFT: ICD-10-CM

## 2024-10-04 DIAGNOSIS — R22.1 LUMP ON NECK: Primary | ICD-10-CM

## 2024-10-04 DIAGNOSIS — M79.672 CHRONIC PAIN OF LEFT HEEL: ICD-10-CM

## 2024-10-04 PROCEDURE — 99214 OFFICE O/P EST MOD 30 MIN: CPT | Performed by: STUDENT IN AN ORGANIZED HEALTH CARE EDUCATION/TRAINING PROGRAM

## 2024-10-04 NOTE — PROGRESS NOTES
"Chief Complaint  Chief Complaint   Patient presents with    Mass     \"Lump\"on back of neck    Ankle Pain     Left ankle pain     Subjective        Geetha Lawton is a 44 y.o. female who presents to [facility].    History of Present Illness  Here for acute visit.  Lump on back of neck x 1 yr.  It has fluctuated in size.  Since making appt it has gone down in size.  When it was bigger she described it as being itchy.  She has family hx of silverman syndrome and wanted it looked at sooner rather than later.      L ankle discomfort.    Feels like it is on \"fire\".  It is L lateral ankle down into lateral dorsal foot.  No specific injury that she can remember.  She has been more active than normal recently as she is renovating laundry room.    She has started wearing ankle brace for support which is somewhat helpful.  It gets worse through the day as she is up on it.      Objective   /77   Pulse 116   Resp 18   Ht 156 cm (61.42\")   Wt 90.3 kg (199 lb)   SpO2 98%   BMI 37.09 kg/m²     Estimated body mass index is 37.09 kg/m² as calculated from the following:    Height as of this encounter: 156 cm (61.42\").    Weight as of this encounter: 90.3 kg (199 lb).     Physical Exam   GEN: In no acute distress, non toxic appearing  SKIN: Palpable nodule < 1 cm R posterior base of neck to R of midline.  Non tender.  No overlying skin changes.    MSK: Ttp L foot, dorsal lateral surface.  No palpable swelling, skin changes or other abnormality.  Full ROM of L ankle and foot w/o reported worsening of symptoms.       Result Review :              Assessment and Plan     Diagnoses and all orders for this visit:    1. Lump on neck (Primary)  Suspect dermoid cyst but she has strong family hx of Silverman syndrome and would like further eval.  Refer to dermatology for consideration of excision w/ pathology.    -     Ambulatory Referral to Dermatology    2. Acute foot pain, left  3. Chronic pain of " left heel  Suspect tendinitis w/ recent increased manual labor causing worsening pain.  Stress fracture possible as well, though seems less likely.  She has heel pain that is chronic from past injury and would like evaluated as well.  Refer to podiatry.    -     Ambulatory Referral to Podiatry       Follow Up   With specialists as above

## 2024-11-07 ENCOUNTER — OFFICE VISIT (OUTPATIENT)
Dept: PODIATRY | Facility: CLINIC | Age: 44
End: 2024-11-07
Payer: OTHER GOVERNMENT

## 2024-11-07 VITALS — HEIGHT: 61 IN | WEIGHT: 199 LBS | BODY MASS INDEX: 37.57 KG/M2 | RESPIRATION RATE: 20 BRPM

## 2024-11-07 DIAGNOSIS — M21.42 ACQUIRED PES PLANUS, LEFT: ICD-10-CM

## 2024-11-07 DIAGNOSIS — M21.862 ACQUIRED POSTERIOR EQUINUS, LEFT: ICD-10-CM

## 2024-11-07 DIAGNOSIS — M77.42 METATARSALGIA, LEFT FOOT: ICD-10-CM

## 2024-11-07 DIAGNOSIS — M79.672 LEFT FOOT PAIN: Primary | ICD-10-CM

## 2024-11-07 RX ORDER — CETIRIZINE HYDROCHLORIDE 10 MG/1
10 TABLET ORAL DAILY
COMMUNITY

## 2024-11-07 RX ORDER — METHYLPREDNISOLONE 4 MG/1
TABLET ORAL
Qty: 21 TABLET | Refills: 0 | Status: SHIPPED | OUTPATIENT
Start: 2024-11-07
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
## 2024-11-13 ENCOUNTER — OFFICE VISIT (OUTPATIENT)
Dept: FAMILY MEDICINE CLINIC | Facility: CLINIC | Age: 44
End: 2024-11-13
Payer: OTHER GOVERNMENT

## 2024-11-13 VITALS
OXYGEN SATURATION: 96 % | RESPIRATION RATE: 18 BRPM | WEIGHT: 201.2 LBS | SYSTOLIC BLOOD PRESSURE: 121 MMHG | BODY MASS INDEX: 37.99 KG/M2 | HEIGHT: 61 IN | HEART RATE: 103 BPM | DIASTOLIC BLOOD PRESSURE: 86 MMHG

## 2024-11-13 DIAGNOSIS — J01.10 ACUTE NON-RECURRENT FRONTAL SINUSITIS: Primary | ICD-10-CM

## 2024-11-13 PROCEDURE — 99213 OFFICE O/P EST LOW 20 MIN: CPT | Performed by: STUDENT IN AN ORGANIZED HEALTH CARE EDUCATION/TRAINING PROGRAM

## 2024-11-13 RX ORDER — DOXYCYCLINE 100 MG/1
100 CAPSULE ORAL 2 TIMES DAILY
Qty: 14 CAPSULE | Refills: 0 | Status: SHIPPED | OUTPATIENT
Start: 2024-11-13 | End: 2024-11-20

## 2024-11-13 RX ORDER — FLUTICASONE PROPIONATE 50 MCG
2 SPRAY, SUSPENSION (ML) NASAL DAILY
Qty: 16 G | Refills: 1 | Status: SHIPPED | OUTPATIENT
Start: 2024-11-13

## 2024-11-13 NOTE — PROGRESS NOTES
"Chief Complaint  Chief Complaint   Patient presents with    Cough    Nasal Congestion    Sore Throat    Fatigue     Subjective        Geetha Lawton is a 44 y.o. female who presents to [facility].    History of Present Illness  Here for acute visit.  Above symptoms started 4 days ago.  She was very fatigued on Monday and half the day Tuesday.   Fatigue is a little better.  Cough is worse and nose is running w/ a faucet.    She has post nasal drip.  She feels like it is sinus related.   No fevers.   No vomiting or diarrhea.      Objective   /86   Pulse 103   Resp 18   Ht 154.9 cm (61\")   Wt 91.3 kg (201 lb 3.2 oz)   SpO2 96%   BMI 38.02 kg/m²     Estimated body mass index is 38.02 kg/m² as calculated from the following:    Height as of this encounter: 154.9 cm (61\").    Weight as of this encounter: 91.3 kg (201 lb 3.2 oz).     Physical Exam   GEN: In no acute distress, non toxic appearing  HEENT: Pupils equal and reactive to light, sclera clear. Mucous membranes moist. Oropharynx without erythema or exudate. No cervical or submandibular lymphadenopathy.  Bilateral TM's wnl.    CV: Regular rate and rhythm, no murmurs, 2+ peripheral pulses, No extremity edema.   RESP: Lungs clear to auscultation anteriorly and posteriorly in all lung fields bilaterally.     Result Review :              Assessment and Plan     Diagnoses and all orders for this visit:    1. Acute non-recurrent frontal sinusitis (Primary)  Treat with doxycycline 100 mg twice daily x 7 days.  Start Flonase 2 sprays in each nostril daily.  Drink plenty of fluids.  Update us if no improvement with above.  -     doxycycline (VIBRAMYCIN) 100 MG capsule; Take 1 capsule by mouth 2 (Two) Times a Day for 7 days.  Dispense: 14 capsule; Refill: 0  -     fluticasone (FLONASE) 50 MCG/ACT nasal spray; Administer 2 sprays into the nostril(s) as directed by provider Daily.  Dispense: 16 g; Refill: 1       Follow Up   If " no improvement with above

## 2025-01-31 ENCOUNTER — OFFICE VISIT (OUTPATIENT)
Dept: FAMILY MEDICINE CLINIC | Facility: CLINIC | Age: 45
End: 2025-01-31
Payer: OTHER GOVERNMENT

## 2025-01-31 VITALS
HEIGHT: 61 IN | HEART RATE: 108 BPM | SYSTOLIC BLOOD PRESSURE: 122 MMHG | RESPIRATION RATE: 18 BRPM | BODY MASS INDEX: 40.52 KG/M2 | OXYGEN SATURATION: 97 % | WEIGHT: 214.6 LBS | DIASTOLIC BLOOD PRESSURE: 81 MMHG

## 2025-01-31 DIAGNOSIS — R05.1 ACUTE COUGH: Primary | ICD-10-CM

## 2025-01-31 DIAGNOSIS — H92.02 LEFT EAR PAIN: ICD-10-CM

## 2025-01-31 DIAGNOSIS — R09.81 NASAL CONGESTION: ICD-10-CM

## 2025-01-31 LAB
EXPIRATION DATE: NORMAL
FLUAV AG UPPER RESP QL IA.RAPID: NOT DETECTED
FLUBV AG UPPER RESP QL IA.RAPID: NOT DETECTED
INTERNAL CONTROL: NORMAL
Lab: NORMAL
SARS-COV-2 AG UPPER RESP QL IA.RAPID: NOT DETECTED

## 2025-01-31 PROCEDURE — 99213 OFFICE O/P EST LOW 20 MIN: CPT | Performed by: STUDENT IN AN ORGANIZED HEALTH CARE EDUCATION/TRAINING PROGRAM

## 2025-01-31 PROCEDURE — 87428 SARSCOV & INF VIR A&B AG IA: CPT | Performed by: STUDENT IN AN ORGANIZED HEALTH CARE EDUCATION/TRAINING PROGRAM

## 2025-01-31 RX ORDER — OSELTAMIVIR PHOSPHATE 75 MG/1
75 CAPSULE ORAL 2 TIMES DAILY
Qty: 10 CAPSULE | Refills: 0 | Status: SHIPPED | OUTPATIENT
Start: 2025-01-31 | End: 2025-02-05

## 2025-01-31 RX ORDER — CHLORCYCLIZINE HYDROCHLORIDE AND PSEUDOEPHEDRINE HYDROCHLORIDE 25; 60 MG/1; MG/1
1 TABLET ORAL EVERY 8 HOURS PRN
Qty: 30 TABLET | Refills: 0 | Status: SHIPPED | OUTPATIENT
Start: 2025-01-31

## 2025-01-31 RX ORDER — FLUTICASONE PROPIONATE 50 MCG
2 SPRAY, SUSPENSION (ML) NASAL DAILY
Qty: 16 G | Refills: 5 | Status: SHIPPED | OUTPATIENT
Start: 2025-01-31

## 2025-01-31 NOTE — PROGRESS NOTES
"Chief Complaint  Chief Complaint   Patient presents with    Earache    Sore Throat    Nasal Congestion    Cough     Subjective        Geetha Lawton is a 44 y.o. female who presents to [facility].    History of Present Illness  Here for acute visit for above.  Symptoms started w/ phlegm in her throat.  This morning her L ear hurts.  No body aches.    No fevers.    She has nasal congestion w/ sinus drainage leading to a sore throat.    Daughter tested and positive for flu about 1 wk ago.    Most of her family members have been sick w/ similar symptoms.    She took some sudafed for decongestant.      Objective   /81   Pulse 108   Resp 18   Ht 154.9 cm (61\")   Wt 97.3 kg (214 lb 9.6 oz)   SpO2 97%   BMI 40.55 kg/m²     Estimated body mass index is 40.55 kg/m² as calculated from the following:    Height as of this encounter: 154.9 cm (61\").    Weight as of this encounter: 97.3 kg (214 lb 9.6 oz).     Physical Exam   GEN: In no acute distress, fatigued appearing  HEENT: Pupils equal and reactive to light, sclera clear. Mucous membranes moist. Oropharynx without erythema or exudate.  L TM w/ fluid behind but no erythema, purulence or bulging.    CV: Regular rate and rhythm, no murmurs  RESP: Lungs clear to auscultation anteriorly and posteriorly in all lung fields bilaterally.     Result Review :              Assessment and Plan     Diagnoses and all orders for this visit:    1. Acute cough (Primary)  -     POCT SARS-CoV-2 Antigen HUSEYIN + Flu  -     oseltamivir (Tamiflu) 75 MG capsule; Take 1 capsule by mouth 2 (Two) Times a Day for 5 days.  Dispense: 10 capsule; Refill: 0    2. Nasal congestion  -     POCT SARS-CoV-2 Antigen HUSEYIN + Flu  -     Chlorcyclizine-Pseudoephed (Stahist AD) 25-60 MG tablet; Take 1 each by mouth Every 8 (Eight) Hours As Needed (congestion).  Dispense: 30 tablet; Refill: 0  -     fluticasone (FLONASE) 50 MCG/ACT nasal spray; Administer 2 sprays into " the nostril(s) as directed by provider Daily.  Dispense: 16 g; Refill: 5    3. Left ear pain  -     Chlorcyclizine-Pseudoephed (Stahist AD) 25-60 MG tablet; Take 1 each by mouth Every 8 (Eight) Hours As Needed (congestion).  Dispense: 30 tablet; Refill: 0    Tested for covid / flu in office and negative.  Given close exposure w/ family we will give tamiflu.  Should symptoms worsen in next 24 hours go ahead and start the tamiflu.  Today drink lots of fluids, tylenol / ibuprofen prn, stahist tid prn, nasal flonase 2 sprays in each nostril daily, warm tea w/ honey, salt water gargle.  Update us if any changes.        Follow Up   If no improvement w/ above

## 2025-07-18 ENCOUNTER — OFFICE VISIT (OUTPATIENT)
Dept: FAMILY MEDICINE CLINIC | Facility: CLINIC | Age: 45
End: 2025-07-18
Payer: OTHER GOVERNMENT

## 2025-07-18 VITALS
WEIGHT: 222 LBS | SYSTOLIC BLOOD PRESSURE: 120 MMHG | OXYGEN SATURATION: 96 % | BODY MASS INDEX: 41.91 KG/M2 | HEART RATE: 101 BPM | RESPIRATION RATE: 18 BRPM | DIASTOLIC BLOOD PRESSURE: 76 MMHG | HEIGHT: 61 IN

## 2025-07-18 DIAGNOSIS — F32.0 CURRENT MILD EPISODE OF MAJOR DEPRESSIVE DISORDER WITHOUT PRIOR EPISODE: ICD-10-CM

## 2025-07-18 DIAGNOSIS — M54.41 CHRONIC BILATERAL LOW BACK PAIN WITH BILATERAL SCIATICA: Primary | ICD-10-CM

## 2025-07-18 DIAGNOSIS — M54.42 CHRONIC BILATERAL LOW BACK PAIN WITH BILATERAL SCIATICA: Primary | ICD-10-CM

## 2025-07-18 DIAGNOSIS — F41.1 GENERALIZED ANXIETY DISORDER: ICD-10-CM

## 2025-07-18 DIAGNOSIS — E66.01 OBESITY, MORBID (MORE THAN 100 LBS OVER IDEAL WEIGHT OR BMI > 40): ICD-10-CM

## 2025-07-18 DIAGNOSIS — G89.29 CHRONIC BILATERAL LOW BACK PAIN WITH BILATERAL SCIATICA: Primary | ICD-10-CM

## 2025-07-18 RX ORDER — ESCITALOPRAM OXALATE 5 MG/1
5 TABLET ORAL DAILY
Qty: 30 TABLET | Refills: 2 | Status: SHIPPED | OUTPATIENT
Start: 2025-07-18

## 2025-07-18 RX ORDER — METHOCARBAMOL 750 MG/1
750 TABLET, FILM COATED ORAL NIGHTLY PRN
Qty: 30 TABLET | Refills: 2 | Status: SHIPPED | OUTPATIENT
Start: 2025-07-18

## 2025-07-18 RX ORDER — SEMAGLUTIDE 0.25 MG/.5ML
0.25 INJECTION, SOLUTION SUBCUTANEOUS WEEKLY
Qty: 2 ML | Refills: 0 | Status: SHIPPED | OUTPATIENT
Start: 2025-07-18
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
## 2025-07-23 DIAGNOSIS — M54.41 CHRONIC BILATERAL LOW BACK PAIN WITH BILATERAL SCIATICA: ICD-10-CM

## 2025-07-23 DIAGNOSIS — G89.29 CHRONIC BILATERAL LOW BACK PAIN WITH BILATERAL SCIATICA: ICD-10-CM

## 2025-07-23 DIAGNOSIS — M54.42 CHRONIC BILATERAL LOW BACK PAIN WITH BILATERAL SCIATICA: ICD-10-CM

## 2025-07-24 ENCOUNTER — TELEPHONE (OUTPATIENT)
Dept: FAMILY MEDICINE CLINIC | Facility: CLINIC | Age: 45
End: 2025-07-24
Payer: OTHER GOVERNMENT

## 2025-08-18 DIAGNOSIS — G89.29 CHRONIC BILATERAL LOW BACK PAIN WITH BILATERAL SCIATICA: ICD-10-CM

## 2025-08-18 DIAGNOSIS — F41.1 GENERALIZED ANXIETY DISORDER: ICD-10-CM

## 2025-08-18 DIAGNOSIS — F32.0 CURRENT MILD EPISODE OF MAJOR DEPRESSIVE DISORDER WITHOUT PRIOR EPISODE: ICD-10-CM

## 2025-08-18 DIAGNOSIS — M54.41 CHRONIC BILATERAL LOW BACK PAIN WITH BILATERAL SCIATICA: ICD-10-CM

## 2025-08-18 DIAGNOSIS — M54.42 CHRONIC BILATERAL LOW BACK PAIN WITH BILATERAL SCIATICA: ICD-10-CM

## 2025-08-18 RX ORDER — ESCITALOPRAM OXALATE 5 MG/1
5 TABLET ORAL DAILY
Qty: 90 TABLET | Refills: 3 | Status: SHIPPED | OUTPATIENT
Start: 2025-08-18

## 2025-08-18 RX ORDER — METHOCARBAMOL 750 MG/1
750 TABLET, FILM COATED ORAL NIGHTLY PRN
Qty: 90 TABLET | Refills: 1 | Status: SHIPPED | OUTPATIENT
Start: 2025-08-18

## 2025-08-27 DIAGNOSIS — E66.01 OBESITY, MORBID (MORE THAN 100 LBS OVER IDEAL WEIGHT OR BMI > 40): Primary | ICD-10-CM

## 2025-08-27 RX ORDER — SEMAGLUTIDE 0.25 MG/.5ML
0.25 INJECTION, SOLUTION SUBCUTANEOUS WEEKLY
Qty: 2 ML | Refills: 0 | Status: CANCELLED | OUTPATIENT
Start: 2025-08-27

## 2025-08-27 RX ORDER — SEMAGLUTIDE 0.5 MG/.5ML
0.5 INJECTION, SOLUTION SUBCUTANEOUS WEEKLY
Qty: 2 ML | Refills: 2 | Status: SHIPPED | OUTPATIENT
Start: 2025-08-27